# Patient Record
Sex: FEMALE | Race: WHITE
[De-identification: names, ages, dates, MRNs, and addresses within clinical notes are randomized per-mention and may not be internally consistent; named-entity substitution may affect disease eponyms.]

---

## 2024-08-30 ENCOUNTER — APPOINTMENT (OUTPATIENT)
Dept: ORTHOPEDIC SURGERY | Facility: CLINIC | Age: 89
End: 2024-08-30
Payer: MEDICARE

## 2024-08-30 VITALS — WEIGHT: 190 LBS | BODY MASS INDEX: 29.82 KG/M2 | HEIGHT: 67 IN

## 2024-08-30 DIAGNOSIS — Z78.9 OTHER SPECIFIED HEALTH STATUS: ICD-10-CM

## 2024-08-30 DIAGNOSIS — F03.90 UNSPECIFIED DEMENTIA W/OUT BEHAVIORAL DISTURBANCE: ICD-10-CM

## 2024-08-30 DIAGNOSIS — I25.2 OLD MYOCARDIAL INFARCTION: ICD-10-CM

## 2024-08-30 DIAGNOSIS — S69.92XA UNSPECIFIED INJURY OF LEFT WRIST, HAND AND FINGER(S), INITIAL ENCOUNTER: ICD-10-CM

## 2024-08-30 DIAGNOSIS — S62.625A DISPLACED FRACTURE OF MIDDLE PHALANX OF LEFT RING FINGER, INITIAL ENCOUNTER FOR CLOSED FRACTURE: ICD-10-CM

## 2024-08-30 PROCEDURE — 99204 OFFICE O/P NEW MOD 45 MIN: CPT | Mod: 57

## 2024-08-30 PROCEDURE — 26720 TREAT FINGER FRACTURE EACH: CPT | Mod: LT

## 2024-08-30 PROCEDURE — 73140 X-RAY EXAM OF FINGER(S): CPT | Mod: LT

## 2024-08-30 NOTE — HISTORY OF PRESENT ILLNESS
[5] : 5 [Retired] : Work status: retired [de-identified] : 89 year old female presents left ring finger fx after falling on 8/21/2024. pt went to [facility], xrays dx: closed displaced fx of middle phalanx of left ring finger.    SHe has a history of dementia, her son is with her and provides the history [] : Post Surgical Visit: no [FreeTextEntry5] : left ring finger fx after falling on 8/21/2024. pt went to [facility], xrays dx: closed displaced fx of middle phalanx of left ring finger [FreeTextEntry1] : left ring finger  [de-identified] : xrays [de-identified] : none

## 2024-08-30 NOTE — PHYSICAL EXAM
[3rd] : 3rd [Distal Phalanx] : distal phalanx [Left] : left fingers [FreeTextEntry9] : Min displaced middle phalanx fracture

## 2024-09-27 ENCOUNTER — APPOINTMENT (OUTPATIENT)
Dept: ORTHOPEDIC SURGERY | Facility: CLINIC | Age: 89
End: 2024-09-27